# Patient Record
Sex: MALE | Race: WHITE | NOT HISPANIC OR LATINO | Employment: UNEMPLOYED | URBAN - METROPOLITAN AREA
[De-identification: names, ages, dates, MRNs, and addresses within clinical notes are randomized per-mention and may not be internally consistent; named-entity substitution may affect disease eponyms.]

---

## 2023-10-29 ENCOUNTER — HOSPITAL ENCOUNTER (EMERGENCY)
Facility: CLINIC | Age: 28
Discharge: HOME | End: 2023-10-29
Attending: EMERGENCY MEDICINE | Admitting: EMERGENCY MEDICINE

## 2023-10-29 VITALS
WEIGHT: 135 LBS | OXYGEN SATURATION: 100 % | DIASTOLIC BLOOD PRESSURE: 85 MMHG | RESPIRATION RATE: 20 BRPM | BODY MASS INDEX: 19.99 KG/M2 | TEMPERATURE: 98.9 F | HEIGHT: 69 IN | SYSTOLIC BLOOD PRESSURE: 144 MMHG | HEART RATE: 60 BPM

## 2023-10-29 DIAGNOSIS — K08.89 PAIN, DENTAL: ICD-10-CM

## 2023-10-29 PROCEDURE — 64450 NJX AA&/STRD OTHER PN/BRANCH: CPT

## 2023-10-29 PROCEDURE — 99283 EMERGENCY DEPT VISIT LOW MDM: CPT | Mod: 25

## 2023-10-29 RX ORDER — AMOXICILLIN 500 MG/1
1000 CAPSULE ORAL 2 TIMES DAILY
Qty: 40 CAPSULE | Refills: 0 | Status: SHIPPED | OUTPATIENT
Start: 2023-10-29 | End: 2023-11-08

## 2023-10-29 NOTE — ED TRIAGE NOTES
Left upper tooth pain - cracked tooth - suppose to follow up with dentist has insurance issues   Increase pain last eight hours      Triage Assessment (Adult)       Row Name 10/29/23 0756          Triage Assessment    Airway WDL WDL        Respiratory WDL    Respiratory WDL WDL        Cardiac WDL    Cardiac WDL WDL        Cognitive/Neuro/Behavioral WDL    Cognitive/Neuro/Behavioral WDL WDL

## 2023-10-29 NOTE — ED PROVIDER NOTES
"  History     Chief Complaint:  Dental Pain    HPI   Tejas Davalos is a 28 year old male who presents with left upper tooth pain beginning last night. States he has had a cracked tooth \"for some time\" but has been unable to see a dentist because his insurance needs to be removed. His pain has been worsening for the past 9.5 hours. Denies hot or cold sensitivity. He has tried Tylenol, oral gels, and a heat compressor with no improvement of pain. Reports he came here from [place] for a concert.    Independent Historian:   None - Patient Only    Review of External Notes:   Chart review from outside hospital with one prior dental complaint     Medications:    The patient denies any current medications.    Past Medical History:    Allergic rhinitis  ADD    Physical Exam   Patient Vitals for the past 24 hrs:   BP Temp Temp src Pulse Resp SpO2 Height Weight   10/29/23 0753 (!) 144/85 98.9  F (37.2  C) Temporal 60 20 100 % 1.753 m (5' 9\") 61.2 kg (135 lb)        Physical Exam  GENERAL: well developed, pleasant  HEAD: atraumatic  EYES: pupils reactive, extraocular muscles intact, conjunctivae normal  ENT:  mucus membranes moist. Left upper molar shows a chronic torsten without abscess  NECK:  trachea midline, normal range of motion  RESPIRATORY: no tachypnea, breath sounds clear to auscultation   CVS: normal S1/S2, no murmurs, intact distal pulses  ABDOMEN: soft, nontender, nondistention  MUSCULOSKELETAL: no deformities  SKIN: warm and dry, no acute rashes or ulceration  NEURO: GCS 15, cranial nerves intact, alert and oriented x3  PSYCH:  Mood/affect normal    Left upper molar shows chronic car w/out abscess  Emergency Department Course     Procedures     Dental Block     Procedure: Dental Block  Indication: Toothache/jaw pain  Consent: Verbal  Location: Left Upper Molar   Procedure Detail: 1.8 cc of bupivacaine 0.5% with epinephrine was injected via Supraperiosteal block:  Good visualization and identification of " landmarks is achieved. Anesthetic is injected using a 27g needle into the muccobuccal fold superjacent to the affected tooth, resulting in immediate pain relief of the affected tooth and minimal bleeding.   Patient Status: The patient tolerated the procedure well: Yes. There were no complications.  Emergency Department Course & Assessments:       Interventions:  Medications - No data to display     Assessments:  0826 I entered the patient's room and obtained history. Performed a dental block procedure. I believe he is safe for discharge at this time.    Independent Interpretation (X-rays, CTs, rhythm strip):  None    Consultations/Discussion of Management or Tests:  None        Social Determinants of Health affecting care:   None    Disposition:  The patient was discharged to home.     Impression & Plan    CMS Diagnoses: None    Medical Decision Making:  Patient presents from out of town for [employer] dance concert and left upper molar pain from chronic padma.  No signs of facial abscess or local abscess.  He was given dental block with relief of his pain.  Discussed antibiotics, motrin and tylenol and follow up with dental care at home.    Diagnosis:    ICD-10-CM    1. Pain, dental  K08.89            Discharge Medications:  New Prescriptions    AMOXICILLIN (AMOXIL) 500 MG CAPSULE    Take 2 capsules (1,000 mg) by mouth 2 times daily for 10 days     Scribe Disclosure:  BO Jatinnathan Hired, am serving as a scribe at 8:07 AM on 10/29/2023 to document services personally performed by Jono Farmer MD based on my observations and the provider's statements to me.   10/29/2023   Jono Farmer MD Adams, Shaun L, MD  10/29/23 1938